# Patient Record
Sex: FEMALE | Race: WHITE | NOT HISPANIC OR LATINO | Employment: FULL TIME | URBAN - NONMETROPOLITAN AREA
[De-identification: names, ages, dates, MRNs, and addresses within clinical notes are randomized per-mention and may not be internally consistent; named-entity substitution may affect disease eponyms.]

---

## 2021-04-07 ENCOUNTER — APPOINTMENT (EMERGENCY)
Dept: RADIOLOGY | Facility: HOSPITAL | Age: 41
End: 2021-04-07
Payer: COMMERCIAL

## 2021-04-07 ENCOUNTER — HOSPITAL ENCOUNTER (EMERGENCY)
Facility: HOSPITAL | Age: 41
Discharge: HOME | End: 2021-04-07
Attending: EMERGENCY MEDICINE | Admitting: EMERGENCY MEDICINE
Payer: COMMERCIAL

## 2021-04-07 VITALS
BODY MASS INDEX: 38.41 KG/M2 | HEART RATE: 71 BPM | DIASTOLIC BLOOD PRESSURE: 83 MMHG | SYSTOLIC BLOOD PRESSURE: 149 MMHG | TEMPERATURE: 96.9 F | RESPIRATION RATE: 16 BRPM | OXYGEN SATURATION: 98 % | WEIGHT: 225 LBS | HEIGHT: 64 IN

## 2021-04-07 DIAGNOSIS — S93.409A ANKLE SPRAIN: Primary | ICD-10-CM

## 2021-04-07 PROCEDURE — 99283 EMERGENCY DEPT VISIT LOW MDM: CPT

## 2021-04-07 PROCEDURE — 73610 X-RAY EXAM OF ANKLE: CPT

## 2021-04-07 PROCEDURE — 99282 EMERGENCY DEPT VISIT SF MDM: CPT | Performed by: EMERGENCY MEDICINE

## 2021-04-07 RX ORDER — SUMATRIPTAN 100 MG/1
TABLET, FILM COATED ORAL
COMMUNITY
Start: 2021-04-05

## 2021-04-07 RX ORDER — LEVOTHYROXINE SODIUM 0.03 MG/1
25 TABLET ORAL DAILY
COMMUNITY

## 2021-04-07 NOTE — ED PROVIDER NOTES
History  Chief Complaint   Patient presents with    Ankle Injury     Patient has left ankle pain after rolling ankle while roller skating last night  Patient is a 81-year-old otherwise healthy female presenting to the emergency department complaining of left ankle pain, states yesterday evening she was roller-skating with her roller [place] team when she twisted the ankle, pain worsened throughout the day today prompting her to come to the emergency department for evaluation, she denies pain or injury elsewhere, no numbness or tingling          Prior to Admission Medications   Prescriptions Last Dose Informant Patient Reported? Taking? SUMAtriptan (IMITREX) 100 mg tablet   Yes Yes   Sig: One pill by mouth at onset of migraine  May repeat in 2 hours but not more than 2 tablets in 24 hours   levothyroxine 25 mcg tablet   Yes Yes   Sig: Take 25 mcg by mouth daily      Facility-Administered Medications: None       History reviewed  No pertinent past medical history  History reviewed  No pertinent surgical history  History reviewed  No pertinent family history  I have reviewed and agree with the history as documented  E-Cigarette/Vaping    E-Cigarette Use Never User      E-Cigarette/Vaping Substances     Social History     Tobacco Use    Smoking status: Never Smoker    Smokeless tobacco: Never Used   Substance Use Topics    Alcohol use: Not Currently    Drug use: Yes     Types: Marijuana       Review of Systems   Constitutional: Negative  HENT: Negative  Eyes: Negative  Respiratory: Negative  Cardiovascular: Negative  Gastrointestinal: Negative  Endocrine: Negative  Genitourinary: Negative  Musculoskeletal: Positive for arthralgias (Left ankle pain)  Skin: Negative  Allergic/Immunologic: Negative  Neurological: Negative  Hematological: Negative  Psychiatric/Behavioral: Negative          Physical Exam  Physical Exam  Constitutional:       Appearance: She is well-developed  HENT:      Head: Normocephalic and atraumatic  Eyes:      Conjunctiva/sclera: Conjunctivae normal       Pupils: Pupils are equal, round, and reactive to light  Neck:      Musculoskeletal: Normal range of motion and neck supple  Cardiovascular:      Rate and Rhythm: Normal rate  Pulmonary:      Effort: Pulmonary effort is normal    Abdominal:      Palpations: Abdomen is soft  Musculoskeletal: Normal range of motion  Feet:    Feet:      Comments: Mild tenderness to palpate over the medial malleolus of the left ankle, mild swelling with faint ecchymosis, 2+ DP pulses, distal sensation and motor is intact with brisk capillary refill  Skin:     General: Skin is warm and dry  Neurological:      Mental Status: She is alert and oriented to person, place, and time  Vital Signs  ED Triage Vitals [04/07/21 1911]   Temperature Pulse Respirations Blood Pressure SpO2   (!) 96 9 °F (36 1 °C) 71 16 149/83 98 %      Temp Source Heart Rate Source Patient Position - Orthostatic VS BP Location FiO2 (%)   Temporal Monitor Sitting Left arm --      Pain Score       5           Vitals:    04/07/21 1911   BP: 149/83   Pulse: 71   Patient Position - Orthostatic VS: Sitting             ED Medications  Medications - No data to display    Diagnostic Studies  Results Reviewed     None                 XR ankle 3+ views LEFT    (Results Pending)                     ED Course  ED Course as of Apr 07 2311 Wed Apr 07, 2021 2311 Imaging findings unremarkable and discussed at bedside, likely mild sprain, patient offered pain medication which she declined, she was provided with an Ace wrap and instructions to ice and elevate, continue anti-inflammatory medication at home, return if symptoms worsen, patient acknowledges understanding and agreement with this plan                                                Disposition  Final diagnoses:    Ankle sprain     Time reflects when diagnosis was documented in both MDM as applicable and the Disposition within this note     Time User Action Codes Description Comment    4/7/2021  7:53 PM Garcia Hoover Add [[id number]] Ankle sprain       ED Disposition     ED Disposition Condition Date/Time Comment    Discharge Stable Wed Apr 7, 2021  7:53 PM Ethan Ban discharge to home/self care  Follow-up Information     Follow up With Specialties Details Why Contact Info    Donald Oquendo DO Family Medicine In 2 days  [address] [place] [address]  [phone]            Discharge Medication List as of 4/7/2021  7:53 PM      CONTINUE these medications which have NOT CHANGED    Details   levothyroxine 25 mcg tablet Take 25 mcg by mouth daily, Historical Med      SUMAtriptan (IMITREX) 100 mg tablet One pill by mouth at onset of migraine  May repeat in 2 hours but not more than 2 tablets in 24 hours, Historical Med           No discharge procedures on file      PDMP Review     None          ED Provider  Electronically Signed by           Kyle Velez DO  04/07/21 1667

## 2024-03-12 ENCOUNTER — HOSPITAL ENCOUNTER (OUTPATIENT)
Dept: ULTRASOUND IMAGING | Facility: HOSPITAL | Age: 44
Discharge: HOME | End: 2024-03-12
Payer: COMMERCIAL

## 2024-03-12 DIAGNOSIS — R10.9 LEFT FLANK PAIN: ICD-10-CM

## 2024-03-12 PROCEDURE — 76775 US EXAM ABDO BACK WALL LIM: CPT

## 2025-02-06 ENCOUNTER — APPOINTMENT (EMERGENCY)
Dept: RADIOLOGY | Facility: HOSPITAL | Age: 45
End: 2025-02-06
Payer: COMMERCIAL

## 2025-02-06 ENCOUNTER — HOSPITAL ENCOUNTER (EMERGENCY)
Facility: HOSPITAL | Age: 45
Discharge: HOME | End: 2025-02-06
Attending: EMERGENCY MEDICINE
Payer: COMMERCIAL

## 2025-02-06 ENCOUNTER — APPOINTMENT (EMERGENCY)
Dept: CT IMAGING | Facility: HOSPITAL | Age: 45
End: 2025-02-06
Payer: COMMERCIAL

## 2025-02-06 ENCOUNTER — APPOINTMENT (EMERGENCY)
Dept: NON INVASIVE DIAGNOSTICS | Facility: HOSPITAL | Age: 45
End: 2025-02-06
Payer: COMMERCIAL

## 2025-02-06 ENCOUNTER — TELEPHONE (OUTPATIENT)
Age: 45
End: 2025-02-06

## 2025-02-06 VITALS
WEIGHT: 187.17 LBS | OXYGEN SATURATION: 97 % | HEART RATE: 63 BPM | TEMPERATURE: 97.6 F | BODY MASS INDEX: 32.13 KG/M2 | SYSTOLIC BLOOD PRESSURE: 155 MMHG | DIASTOLIC BLOOD PRESSURE: 71 MMHG | RESPIRATION RATE: 18 BRPM

## 2025-02-06 DIAGNOSIS — M51.369 BULGING OF LUMBAR INTERVERTEBRAL DISC: ICD-10-CM

## 2025-02-06 DIAGNOSIS — M51.369 DEGENERATIVE DISC DISEASE, LUMBAR: Primary | ICD-10-CM

## 2025-02-06 LAB
ALBUMIN SERPL BCG-MCNC: 4.7 G/DL (ref 3.5–5)
ALP SERPL-CCNC: 82 U/L (ref 34–104)
ALT SERPL W P-5'-P-CCNC: 14 U/L (ref 7–52)
ANION GAP SERPL CALCULATED.3IONS-SCNC: 8 MMOL/L (ref 4–13)
APTT PPP: 27 SECONDS (ref 23–34)
AST SERPL W P-5'-P-CCNC: 19 U/L (ref 13–39)
BACTERIA UR QL AUTO: NORMAL /HPF
BASOPHILS # BLD AUTO: 0.07 THOUSANDS/ΜL (ref 0–0.1)
BASOPHILS NFR BLD AUTO: 1 % (ref 0–1)
BILIRUB SERPL-MCNC: 0.64 MG/DL (ref 0.2–1)
BILIRUB UR QL STRIP: NEGATIVE
BUN SERPL-MCNC: 17 MG/DL (ref 5–25)
CALCIUM SERPL-MCNC: 9.4 MG/DL (ref 8.4–10.2)
CHLORIDE SERPL-SCNC: 104 MMOL/L (ref 96–108)
CK SERPL-CCNC: 107 U/L (ref 26–192)
CLARITY UR: CLEAR
CO2 SERPL-SCNC: 27 MMOL/L (ref 21–32)
COLOR UR: YELLOW
CREAT SERPL-MCNC: 0.76 MG/DL (ref 0.6–1.3)
EOSINOPHIL # BLD AUTO: 0.4 THOUSAND/ΜL (ref 0–0.61)
EOSINOPHIL NFR BLD AUTO: 5 % (ref 0–6)
ERYTHROCYTE [DISTWIDTH] IN BLOOD BY AUTOMATED COUNT: 12.6 % (ref 11.6–15.1)
EXT PREGNANCY TEST URINE: NEGATIVE
EXT. CONTROL: NORMAL
GFR SERPL CREATININE-BSD FRML MDRD: 97 ML/MIN/1.73SQ M
GLUCOSE SERPL-MCNC: 90 MG/DL (ref 65–140)
GLUCOSE UR STRIP-MCNC: NEGATIVE MG/DL
HCT VFR BLD AUTO: 44.8 % (ref 34.8–46.1)
HGB BLD-MCNC: 14.8 G/DL (ref 11.5–15.4)
HGB UR QL STRIP.AUTO: ABNORMAL
IMM GRANULOCYTES # BLD AUTO: 0.03 THOUSAND/UL (ref 0–0.2)
IMM GRANULOCYTES NFR BLD AUTO: 0 % (ref 0–2)
INR PPP: 0.94 (ref 0.85–1.19)
KETONES UR STRIP-MCNC: NEGATIVE MG/DL
LACTATE SERPL-SCNC: 0.9 MMOL/L (ref 0.5–2)
LEUKOCYTE ESTERASE UR QL STRIP: NEGATIVE
LIPASE SERPL-CCNC: 28 U/L (ref 11–82)
LYMPHOCYTES # BLD AUTO: 1.84 THOUSANDS/ΜL (ref 0.6–4.47)
LYMPHOCYTES NFR BLD AUTO: 22 % (ref 14–44)
MAGNESIUM SERPL-MCNC: 1.9 MG/DL (ref 1.9–2.7)
MCH RBC QN AUTO: 30.7 PG (ref 26.8–34.3)
MCHC RBC AUTO-ENTMCNC: 33 G/DL (ref 31.4–37.4)
MCV RBC AUTO: 93 FL (ref 82–98)
MONOCYTES # BLD AUTO: 0.45 THOUSAND/ΜL (ref 0.17–1.22)
MONOCYTES NFR BLD AUTO: 5 % (ref 4–12)
NEUTROPHILS # BLD AUTO: 5.53 THOUSANDS/ΜL (ref 1.85–7.62)
NEUTS SEG NFR BLD AUTO: 67 % (ref 43–75)
NITRITE UR QL STRIP: NEGATIVE
NON-SQ EPI CELLS URNS QL MICRO: NORMAL /HPF
NRBC BLD AUTO-RTO: 0 /100 WBCS
PH UR STRIP.AUTO: 6 [PH]
PLATELET # BLD AUTO: 260 THOUSANDS/UL (ref 149–390)
PMV BLD AUTO: 10 FL (ref 8.9–12.7)
POTASSIUM SERPL-SCNC: 3.8 MMOL/L (ref 3.5–5.3)
PROT SERPL-MCNC: 7.6 G/DL (ref 6.4–8.4)
PROT UR STRIP-MCNC: NEGATIVE MG/DL
PROTHROMBIN TIME: 12.9 SECONDS (ref 12.3–15)
RBC # BLD AUTO: 4.82 MILLION/UL (ref 3.81–5.12)
RBC #/AREA URNS AUTO: NORMAL /HPF
SODIUM SERPL-SCNC: 139 MMOL/L (ref 135–147)
SP GR UR STRIP.AUTO: 1.02 (ref 1–1.03)
UROBILINOGEN UR QL STRIP.AUTO: 0.2 E.U./DL
WBC # BLD AUTO: 8.32 THOUSAND/UL (ref 4.31–10.16)
WBC #/AREA URNS AUTO: NORMAL /HPF

## 2025-02-06 PROCEDURE — 81025 URINE PREGNANCY TEST: CPT | Performed by: EMERGENCY MEDICINE

## 2025-02-06 PROCEDURE — 36415 COLL VENOUS BLD VENIPUNCTURE: CPT | Performed by: EMERGENCY MEDICINE

## 2025-02-06 PROCEDURE — 80053 COMPREHEN METABOLIC PANEL: CPT | Performed by: EMERGENCY MEDICINE

## 2025-02-06 PROCEDURE — 99284 EMERGENCY DEPT VISIT MOD MDM: CPT | Performed by: EMERGENCY MEDICINE

## 2025-02-06 PROCEDURE — 83690 ASSAY OF LIPASE: CPT | Performed by: EMERGENCY MEDICINE

## 2025-02-06 PROCEDURE — 73502 X-RAY EXAM HIP UNI 2-3 VIEWS: CPT

## 2025-02-06 PROCEDURE — 82550 ASSAY OF CK (CPK): CPT | Performed by: EMERGENCY MEDICINE

## 2025-02-06 PROCEDURE — 85730 THROMBOPLASTIN TIME PARTIAL: CPT | Performed by: EMERGENCY MEDICINE

## 2025-02-06 PROCEDURE — 85610 PROTHROMBIN TIME: CPT | Performed by: EMERGENCY MEDICINE

## 2025-02-06 PROCEDURE — 99284 EMERGENCY DEPT VISIT MOD MDM: CPT

## 2025-02-06 PROCEDURE — 74176 CT ABD & PELVIS W/O CONTRAST: CPT

## 2025-02-06 PROCEDURE — 81001 URINALYSIS AUTO W/SCOPE: CPT | Performed by: EMERGENCY MEDICINE

## 2025-02-06 PROCEDURE — 93971 EXTREMITY STUDY: CPT | Performed by: SURGERY

## 2025-02-06 PROCEDURE — 83605 ASSAY OF LACTIC ACID: CPT | Performed by: EMERGENCY MEDICINE

## 2025-02-06 PROCEDURE — 93971 EXTREMITY STUDY: CPT

## 2025-02-06 PROCEDURE — 83735 ASSAY OF MAGNESIUM: CPT | Performed by: EMERGENCY MEDICINE

## 2025-02-06 PROCEDURE — 85025 COMPLETE CBC W/AUTO DIFF WBC: CPT | Performed by: EMERGENCY MEDICINE

## 2025-02-06 RX ORDER — METHOCARBAMOL 500 MG/1
500 TABLET, FILM COATED ORAL 2 TIMES DAILY
Qty: 20 TABLET | Refills: 0 | Status: SHIPPED | OUTPATIENT
Start: 2025-02-06 | End: 2025-02-12

## 2025-02-06 NOTE — DISCHARGE INSTRUCTIONS
The muscle relaxer, Robaxin, may make you sleepy.  Please refrain from driving or using machinery.    Patient Education     Herniated Disc Discharge Instructions   About this topic   The spine is made up of bones called vertebrae. These bones are lined up on top of each other. In between the bones there are discs. They act like shock absorbers and also allow movement. A herniated disc happens when the outer shell of the disc breaks. Then, the jelly-like material inside the disc leaks out. This can bother nearby nerves. It then can cause pain, numbness, weakness, or tingling in the back or legs.       What care is needed at home?   Ask your doctor what you need to do when you go home. Make sure you ask questions if you do not understand what the doctor says.  Try ice or heat to help with pain.  Place an ice pack or a bag of frozen peas wrapped in a towel over the painful part. Never put ice right on the skin. Do not leave the ice on more than 10 to 15 minutes at a time.  Put a heating pad on your back for no more than 20 minutes at a time. Never go to sleep with a heating pad on as this can cause burns.  Stay active. Do not stay in bed longer than 1 to 2 days. Staying in bed or resting too long may make your back weak and stiff. This may make your problem worse.  Talk to your doctor about exercises you can do.  What follow-up care is needed?   Your doctor may ask you to make visits to the office to check on your progress. Be sure to keep these visits.  Your doctor may send you to an expert who works with spines or nerves.  Your doctor may send you to a physical therapist (PT). The PT will teach you exercises and stretches for your back.  What drugs may be needed?   The doctor may order drugs to:  Help with pain and swelling  Help the muscles relax  Will physical activity be limited?   Talk to your doctor about how much activity is right for you. Stay away from activities that make your back pain worse. Avoid lifting  heavy objects, picking up objects off the floor, and hard activities like playing sports.  What problems could happen?   Nerve problems  Less movement of arms or legs  Numbness in the arms or legs  Difficulty walking or doing other activities  Bowel and bladder control problems  Ongoing pain  What can be done to prevent this health problem?   Stay active and work out to keep your muscles strong and flexible.   Keep a healthy weight to avoid putting too much stress on your spine.  Use good posture.  Use proper ways to lift and bend:  Spread your feet apart so you have a good base of support. Then, bend with your knees when you  something from the ground.  When lifting and moving an object, keep your back straight. Keep the object as close to your body as possible. Do not twist. Instead, move your feet to the direction you are going.  When do I need to call the doctor?   Pain along with a fever  Not able to hold your foot up. This is called “foot drop”.  Loss of bladder control  Very bad pain that limits your movements like walking  You are not feeling better in 2 to 3 days or you are feeling worse  Teach Back: Helping You Understand   The Teach Back Method helps you understand the information we are giving you. After you talk with the staff, tell them in your own words what you learned. This helps to make sure the staff has described each thing clearly. It also helps to explain things that may have been confusing. Before going home, make sure you can do these:  I can tell you about my condition.  I can tell you what may help ease my pain.  I can tell you what I will do if I have more pain, am not able to hold my foot up, or have a loss of bladder control.  Last Reviewed Date   2021-07-13  Consumer Information Use and Disclaimer   This generalized information is a limited summary of diagnosis, treatment, and/or medication information. It is not meant to be comprehensive and should be used as a tool to help the  user understand and/or assess potential diagnostic and treatment options. It does NOT include all information about conditions, treatments, medications, side effects, or risks that may apply to a specific patient. It is not intended to be medical advice or a substitute for the medical advice, diagnosis, or treatment of a health care provider based on the health care provider's examination and assessment of a patient’s specific and unique circumstances. Patients must speak with a health care provider for complete information about their health, medical questions, and treatment options, including any risks or benefits regarding use of medications. This information does not endorse any treatments or medications as safe, effective, or approved for treating a specific patient. [employer] and its affiliates disclaim any warranty or liability relating to this information or the use thereof. The use of this information is governed by the Terms of Use, available at https://www.Expa.Hiperos/en/know/clinical-effectiveness-terms   Copyright   Copyright © 2024 [employer] and its affiliates and/or licensors. All rights reserved.  Patient Education     Degenerative Disc Disease ED   General Information   You came to the Emergency Department (ED) for degenerative disc disease. Normally, your discs act like shock absorbers since they sit between the vertebrae in your back. As you age, the discs breakdown. This may cause back pain. Bulging discs or tissues near them may press on the nerves of your spine. This may cause you to have pain, weakness, numbness, or tingling.  You may be waiting on some test results. The staff will contact you if there are concerning results.  What care is needed at home?   Call your regular doctor to let them know you were in the ED. Make a follow-up appointment if you were told to.  Stay as active as you can without causing too much pain. It is OK to rest your back for a day or so. But be  sure to get up and move around gently during the day as you are able. After a few days, slowly start to increase your activity level as you are able to. If something causes your pain to come back or get worse, stop and go back to doing easier activities that did not hurt.  You may want to sleep with a pillow under or between your knees if this eases your pain.  You may want to take medicines like acetaminophen, ibuprofen, or naproxen to help with pain.  When do I need to get emergency help?   Return to the ED if:   You are unable to walk or cannot control your bowels or bladder.  When do I need to call the doctor?   The numbness or weakness in your arm or leg gets worse.  Your pain is getting worse, even with medicines and rest.  You are not able to do your normal activities because of the pain.  You have new or worsening symptoms.  Last Reviewed Date   2021-04-08  Consumer Information Use and Disclaimer   This generalized information is a limited summary of diagnosis, treatment, and/or medication information. It is not meant to be comprehensive and should be used as a tool to help the user understand and/or assess potential diagnostic and treatment options. It does NOT include all information about conditions, treatments, medications, side effects, or risks that may apply to a specific patient. It is not intended to be medical advice or a substitute for the medical advice, diagnosis, or treatment of a health care provider based on the health care provider's examination and assessment of a patient’s specific and unique circumstances. Patients must speak with a health care provider for complete information about their health, medical questions, and treatment options, including any risks or benefits regarding use of medications. This information does not endorse any treatments or medications as safe, effective, or approved for treating a specific patient. [employer] and its affiliates disclaim any warranty or  liability relating to this information or the use thereof. The use of this information is governed by the Terms of Use, available at https://www.wolterskluwer.com/en/know/clinical-effectiveness-terms   Copyright   Copyright © 2024 [employer] and its affiliates and/or licensors. All rights reserved.

## 2025-02-06 NOTE — TELEPHONE ENCOUNTER
Caller: Kathy at the ED    Doctor: Dr. Mendoza referral    Reason for call:     Kathy transferred to [facility]    Call back#: n/a

## 2025-02-06 NOTE — Clinical Note
Martha Hair was seen and treated in our emergency department on 2/6/2025.                Diagnosis:     Martha  may return to work on return date.    She may return on this date: 02/10/2025         If you have any questions or concerns, please don't hesitate to call.      Trey Rivera MD    ______________________________           _______________          _______________  Hospital Representative                              Date                                Time

## 2025-02-06 NOTE — ED PROVIDER NOTES
Time reflects when diagnosis was documented in both MDM as applicable and the Disposition within this note       Time User Action Codes Description Comment    2/6/2025  2:54 PM Trey Rivera Add [M51.369] Degenerative disc disease, lumbar     2/6/2025  3:06 PM Trey Rivera Add [M51.369] Bulging of lumbar intervertebral disc     2/6/2025  3:06 PM Trey Rivera Modify [M51.369] Bulging of lumbar intervertebral disc L3- L4          ED Disposition       ED Disposition   Discharge    Condition   Stable    Date/Time   Thu Feb 6, 2025  3:08 PM    Comment   Martha SARAI Hair discharge to home/self care.                   Assessment & Plan       Medical Decision Making  Amount and/or Complexity of Data Reviewed  Labs: ordered. Decision-making details documented in ED Course.  Radiology: ordered and independent interpretation performed. Decision-making details documented in ED Course.  Discussion of management or test interpretation with external provider(s): At risk for not limited to UTI, cystitis, bladder stone, kidney stone, ureteral stone, sciatica, muscle strain    Risk  Prescription drug management.        ED Course as of 02/06/25 1508   Thu Feb 06, 2025   1354 No DVT per vascular technician.   1354 Doubt leg ischemia as the patient has good pulses.  Symptoms been ongoing for the past 1 week.  No signs of ischemia.  Dorsalis pedis pulse and posterior tibial pulse are 2+.   1506 Patient does not wish to have steroids at this time.   1508 Discussed with patient about lab and CAT scan results.  Made aware that the urine showed blood but the microscopic showed no red blood cells.  Patient's pain is consistent with her CT findings on the lumbar spine.       Medications - No data to display    ED Risk Strat Scores                          SBIRT 20yo+      Flowsheet Row Most Recent Value   Initial Alcohol Screen: US AUDIT-C     1. How often do you have a drink containing alcohol? 0 Filed at: 02/06/2025 9806   2.  How many drinks containing alcohol do you have on a typical day you are drinking?  0 Filed at: 02/06/2025 1153   3a. Male UNDER 65: How often do you have five or more drinks on one occasion? 0 Filed at: 02/06/2025 0510   3b. FEMALE Any Age, or MALE 65+: How often do you have 4 or more drinks on one occassion? 0 Filed at: 02/06/2025 1335   Audit-C Score 0 Filed at: 02/06/2025 1338   TOM: How many times in the past year have you...    Used an illegal drug or used a prescription medication for non-medical reasons? Never Filed at: 02/06/2025 9788                            History of Present Illness       Chief Complaint   Patient presents with    Hip Pain     leg    Leg Pain    Blood in Urine     Pt has complaints of left hip pain radiating to left inner thigh for over 1 week, pt reports blood in urine at this time.        History reviewed. No pertinent past medical history.   History reviewed. No pertinent surgical history.   History reviewed. No pertinent family history.   Social History     Tobacco Use    Smoking status: Never    Smokeless tobacco: Never   Vaping Use    Vaping status: Never Used   Substance Use Topics    Alcohol use: Not Currently    Drug use: Yes     Types: Marijuana      E-Cigarette/Vaping    E-Cigarette Use Never User       E-Cigarette/Vaping Substances      I have reviewed and agree with the history as documented.     Patient complains of pain in her left hip area that radiates to the left groin region.  Feels like her leg is cold.  Also with hematuria today.  No dysuria or frequency.  Last menstrual period was last week.      History provided by:  Patient   used: No    Hip Pain  Location:  Left hip  Quality:  Achy  Severity:  Mild  Onset quality:  Gradual  Duration:  1 week  Timing:  Constant  Progression:  Worsening  Chronicity:  New  Context:  Left hip pain rating to left groin and thigh  Relieved by:  Nothing  Worsened by:  Nothing  Associated symptoms: no abdominal  pain, no chest pain, no cough, no diarrhea, no ear pain, no fever, no headaches, no nausea, no rash, no rhinorrhea, no shortness of breath, no sore throat, no vomiting and no wheezing    Leg Pain  Associated symptoms: no fever and no neck pain    Blood in Urine  Irritative symptoms do not include frequency. Pertinent negatives include no abdominal pain, chills, dysuria, fever, flank pain, nausea or vomiting.       Review of Systems   Constitutional:  Negative for chills and fever.   HENT:  Negative for ear pain, hearing loss, rhinorrhea, sore throat, trouble swallowing and voice change.    Eyes:  Negative for pain and discharge.   Respiratory:  Negative for cough, shortness of breath and wheezing.    Cardiovascular:  Negative for chest pain and palpitations.   Gastrointestinal:  Negative for abdominal pain, blood in stool, constipation, diarrhea, nausea and vomiting.   Genitourinary:  Positive for hematuria. Negative for dysuria, flank pain and frequency.   Musculoskeletal:  Negative for joint swelling, neck pain and neck stiffness.   Skin:  Negative for rash and wound.   Neurological:  Negative for dizziness, seizures, syncope, facial asymmetry and headaches.   Psychiatric/Behavioral:  Negative for hallucinations, self-injury and suicidal ideas.    All other systems reviewed and are negative.          Objective       ED Triage Vitals   Temperature Pulse Blood Pressure Respirations SpO2 Patient Position - Orthostatic VS   02/06/25 1332 02/06/25 1332 02/06/25 1336 02/06/25 1332 02/06/25 1332 02/06/25 1332   97.6 °F (36.4 °C) 63 155/71 18 97 % Lying      Temp Source Heart Rate Source BP Location FiO2 (%) Pain Score    02/06/25 1332 02/06/25 1332 -- -- 02/06/25 1332    Temporal Monitor   6      Vitals      Date and Time Temp Pulse SpO2 Resp BP Pain Score FACES Pain Rating User   02/06/25 1336 -- -- -- -- 155/71 -- -- MY   02/06/25 1332 97.6 °F (36.4 °C) 63 97 % 18 -- 6 -- MY            Physical Exam  Vitals and nursing  note reviewed.   Constitutional:       General: She is not in acute distress.     Appearance: She is well-developed.   HENT:      Head: Normocephalic and atraumatic.      Right Ear: External ear normal.      Left Ear: External ear normal.   Eyes:      General: No scleral icterus.        Right eye: No discharge.         Left eye: No discharge.      Extraocular Movements: Extraocular movements intact.      Conjunctiva/sclera: Conjunctivae normal.   Cardiovascular:      Rate and Rhythm: Normal rate and regular rhythm.      Heart sounds: Normal heart sounds. No murmur heard.  Pulmonary:      Effort: Pulmonary effort is normal.      Breath sounds: Normal breath sounds. No wheezing or rales.   Abdominal:      General: Bowel sounds are normal. There is no distension.      Palpations: Abdomen is soft.      Tenderness: There is no abdominal tenderness. There is no guarding or rebound.   Musculoskeletal:         General: No deformity. Normal range of motion.      Cervical back: Normal range of motion and neck supple.      Comments: Both feet appear equally warm.  No signs of cyanosis.  Dorsalis pedis and posterior tibial pulse 2+.   Skin:     General: Skin is warm and dry.      Findings: No rash.   Neurological:      General: No focal deficit present.      Mental Status: She is alert and oriented to person, place, and time.      Cranial Nerves: No cranial nerve deficit.   Psychiatric:         Mood and Affect: Mood normal.         Behavior: Behavior normal.         Thought Content: Thought content normal.         Judgment: Judgment normal.         Results Reviewed       Procedure Component Value Units Date/Time    Urine Microscopic [[id number]]  (Normal) Collected: 02/06/25 1402    Lab Status: Final result Specimen: Urine, Clean Catch Updated: 02/06/25 1422     RBC, UA 0-1 /hpf      WBC, UA 0-1 /hpf      Epithelial Cells Occasional /hpf      Bacteria, UA Occasional /hpf     UA w Reflex to Microscopic w Reflex to Culture  [[id number]]  (Abnormal) Collected: 02/06/25 1402    Lab Status: Final result Specimen: Urine, Clean Catch Updated: 02/06/25 1413     Color, UA Yellow     Clarity, UA Clear     Specific Gravity, UA 1.025     pH, UA 6.0     Leukocytes, UA Negative     Nitrite, UA Negative     Protein, UA Negative mg/dl      Glucose, UA Negative mg/dl      Ketones, UA Negative mg/dl      Urobilinogen, UA 0.2 E.U./dl      Bilirubin, UA Negative     Occult Blood, UA Trace-lysed    Lactic acid, plasma (w/reflex if result > 2.0) [[id number]]  (Normal) Collected: 02/06/25 1339    Lab Status: Final result Specimen: Blood from Arm, Right Updated: 02/06/25 1409     LACTIC ACID 0.9 mmol/L     Narrative:      Result may be elevated if tourniquet was used during collection.    Comprehensive metabolic panel [[id number]] Collected: 02/06/25 1339    Lab Status: Final result Specimen: Blood from Arm, Right Updated: 02/06/25 1409     Sodium 139 mmol/L      Potassium 3.8 mmol/L      Chloride 104 mmol/L      CO2 27 mmol/L      ANION GAP 8 mmol/L      BUN 17 mg/dL      Creatinine 0.76 mg/dL      Glucose 90 mg/dL      Calcium 9.4 mg/dL      AST 19 U/L      ALT 14 U/L      Alkaline Phosphatase 82 U/L      Total Protein 7.6 g/dL      Albumin 4.7 g/dL      Total Bilirubin 0.64 mg/dL      eGFR 97 ml/min/1.73sq m     Narrative:      [employer] guidelines for Chronic Kidney Disease (CKD):     Stage 1 with normal or high GFR (GFR > 90 mL/min/1.73 square meters)    Stage 2 Mild CKD (GFR = 60-89 mL/min/1.73 square meters)    Stage 3A Moderate CKD (GFR = 45-59 mL/min/1.73 square meters)    Stage 3B Moderate CKD (GFR = 30-44 mL/min/1.73 square meters)    Stage 4 Severe CKD (GFR = 15-29 mL/min/1.73 square meters)    Stage 5 End Stage CKD (GFR <15 mL/min/1.73 square meters)  Note: GFR calculation is accurate only with a steady state creatinine    Magnesium [[id number]]  (Normal) Collected: 02/06/25 1339    Lab Status: Final result Specimen: Blood  from Arm, Right Updated: 02/06/25 1409     Magnesium 1.9 mg/dL     Lipase [[id number]]  (Normal) Collected: 02/06/25 1339    Lab Status: Final result Specimen: Blood from Arm, Right Updated: 02/06/25 1409     Lipase 28 u/L     CK [[id number]]  (Normal) Collected: 02/06/25 1339    Lab Status: Final result Specimen: Blood from Arm, Right Updated: 02/06/25 1409     Total  U/L     POCT pregnancy, urine [[id number]]  (Normal) Collected: 02/06/25 1402    Lab Status: Final result Updated: 02/06/25 1402     EXT Preg Test, Ur Negative     Control Valid    Protime-INR [[id number]]  (Normal) Collected: 02/06/25 1339    Lab Status: Final result Specimen: Blood from Arm, Right Updated: 02/06/25 1401     Protime 12.9 seconds      INR 0.94    Narrative:      INR Therapeutic Range    Indication                                             INR Range      Atrial Fibrillation                                               2.0-3.0  Hypercoagulable State                                    2.0.2.3  Left Ventricular Asist Device                            2.0-3.0  Mechanical Heart Valve                                  -    Aortic(with afib, MI, embolism, HF, LA enlargement,    and/or coagulopathy)                                     2.0-3.0 (2.5-3.5)     Mitral                                                             2.5-3.5  Prosthetic/Bioprosthetic Heart Valve               2.0-3.0  Venous thromboembolism (VTE: VT, PE        2.0-3.0    APTT [[id number]]  (Normal) Collected: 02/06/25 1339    Lab Status: Final result Specimen: Blood from Arm, Right Updated: 02/06/25 1401     PTT 27 seconds     CBC and differential [[id number]] Collected: 02/06/25 1339    Lab Status: Final result Specimen: Blood from Arm, Right Updated: 02/06/25 1348     WBC 8.32 Thousand/uL      RBC 4.82 Million/uL      Hemoglobin 14.8 g/dL      Hematocrit 44.8 %      MCV 93 fL      MCH 30.7 pg      MCHC 33.0 g/dL      RDW 12.6 %      MPV 10.0 fL      Platelets 260  Thousands/uL      nRBC 0 /100 WBCs      Segmented % 67 %      Immature Grans % 0 %      Lymphocytes % 22 %      Monocytes % 5 %      Eosinophils Relative 5 %      Basophils Relative 1 %      Absolute Neutrophils 5.53 Thousands/µL      Absolute Immature Grans 0.03 Thousand/uL      Absolute Lymphocytes 1.84 Thousands/µL      Absolute Monocytes 0.45 Thousand/µL      Eosinophils Absolute 0.40 Thousand/µL      Basophils Absolute 0.07 Thousands/µL             XR hip/pelv 2-3 vws left if performed   Final Interpretation by Jaleel Cosby MD (02/06 8495)      No acute osseous abnormality.         Computerized Assisted Algorithm (CAA) may have been used to analyze all applicable images.            Workstation performed: [id number]         CT abdomen pelvis wo contrast   Final Interpretation by Lucho Alvarez MD (02/06 2401)      No acute findings in the abdomen or pelvis within the limits of unenhanced technique.      No collecting system stone or obstruction.            Workstation performed: [id number]         CT recon only lumbar spine   Final Interpretation by Lucho Alvarez MD (02/06 2285)      1.  No fracture or traumatic subluxation.   2.  Chronic bilateral L3 pars defects. No significant L3-L4 listhesis.   3.  L3-L4 disc bulging with foraminal stenosis.   4.  Moderate to severe L3-L4 degenerative disc disease.            Workstation performed: [id number]         VAS lower limb venous duplex study, unilateral/limited    (Results Pending)       Procedures    ED Medication and Procedure Management   Prior to Admission Medications   Prescriptions Last Dose Informant Patient Reported? Taking?   SUMAtriptan (IMITREX) 100 mg tablet   Yes No   Sig: One pill by mouth at onset of migraine. May repeat in 2 hours but not more than 2 tablets in 24 hours   levothyroxine 25 mcg tablet   Yes No   Sig: Take 50 mcg by mouth daily      Facility-Administered Medications: None     Patient's Medications   Discharge Prescriptions     METHOCARBAMOL (ROBAXIN) 500 MG TABLET    Take 1 tablet (500 mg total) by mouth 2 (two) times a day       Start Date: 2/6/2025  End Date: --       Order Dose: 500 mg       Quantity: 20 tablet    Refills: 0       ED SEPSIS DOCUMENTATION   Time reflects when diagnosis was documented in both MDM as applicable and the Disposition within this note       Time User Action Codes Description Comment    2/6/2025  2:54 PM Trey Rivera Add [M51.369] Degenerative disc disease, lumbar     2/6/2025  3:06 PM Trey Rivera Add [M51.369] Bulging of lumbar intervertebral disc     2/6/2025  3:06 PM Trey Rivera Modify [M51.369] Bulging of lumbar intervertebral disc L3- L4                 Trey Rivera MD  02/06/25 8947

## 2025-02-11 RX ORDER — LEVOTHYROXINE SODIUM 50 UG/1
1 TABLET ORAL DAILY
COMMUNITY
Start: 2025-01-20

## 2025-02-11 RX ORDER — LORATADINE 10 MG/1
10 TABLET ORAL
COMMUNITY

## 2025-02-12 ENCOUNTER — CONSULT (OUTPATIENT)
Dept: PAIN MEDICINE | Facility: CLINIC | Age: 45
End: 2025-02-12
Payer: COMMERCIAL

## 2025-02-12 VITALS — BODY MASS INDEX: 33.42 KG/M2 | WEIGHT: 188.6 LBS | HEIGHT: 63 IN

## 2025-02-12 DIAGNOSIS — M47.26 OTHER SPONDYLOSIS WITH RADICULOPATHY, LUMBAR REGION: Primary | ICD-10-CM

## 2025-02-12 PROCEDURE — 99204 OFFICE O/P NEW MOD 45 MIN: CPT | Performed by: ANESTHESIOLOGY

## 2025-02-12 RX ORDER — FOLIC ACID 0.4 MG
400 TABLET ORAL DAILY
COMMUNITY

## 2025-02-12 RX ORDER — GABAPENTIN 300 MG/1
300 CAPSULE ORAL 3 TIMES DAILY
Qty: 90 CAPSULE | Refills: 2 | Status: SHIPPED | OUTPATIENT
Start: 2025-02-12

## 2025-02-12 NOTE — PROGRESS NOTES
Assessment  1. Other spondylosis with radiculopathy, lumbar region  -     Ambulatory referral to Physical Therapy; Future  -     gabapentin (NEURONTIN) 300 mg capsule; Take 1 capsule (300 mg total) by mouth 3 (three) times a day    Left-sided lumbar radicular pain in the L3 dermatomal distribution accompanied by pain limited weakness numbness and paresthesias.  Patient has not yet participated with formal PT but has particpated with chiropractic efforts. Subacute pain with decreased participation with IADLs over the past 3 months.  Has been taking OTC ibuprofen and tylenol in addition to Robaxin with modest benefit.  5/5 strength bilaterally, positive SLR left-sided. Reflexes 2+.  Additionally there is positive facet loading, left greater than right. Denies any gait instability, saddle anesthesia. On CT imaging moderate to severe L3-L4 disc space narrowing, subchondral sclerosis, and endplate spondylosis, consistent with degenerative disc disease. No bony canal stenosis or foraminal narrowing. Circumferential disc bulging at L3-L4 with mild right and moderate left foraminal stenosis.  Risks, benefits alternatives epidural steroid injections thoroughly discussed with patient.  Handouts provided questions answered to patient's satisfaction.  Lifestyle modifications extensively discussed including diet, exercise and weight loss in addition to core strengthening.  Will proceed with multimodal pain therapy plan as noted below:    Plan  -L3-L4 LESI; may call back to schedule procedure  -f/u 6 weeks  -gabapentin 300 mg t.i.d. Ordered for patient; counseled regarding sedative effects of taking this medication and provided up titration calendar.  Counseled not to take medication while driving or operating heavy machinery/using stairs  -script provided for formal physical therapy for lumbar radiculopathy; Physician directed home exercise plan as per [employer] demonstrated and handouts provided that patient plans to participate  with for 1 hour, twice a week for the next 6 weeks.     There are risks associated with opioid medications, including dependence, addiction and tolerance. The patient understands and agrees to use these medications only as prescribed. Potential side effects of the medications include, but are not limited to, constipation, drowsiness, addiction, impaired judgment and risk of fatal overdose if not taken as prescribed. The patient was warned against driving while taking sedation medications or operating heavy machinery. The patient voiced understanding. Sharing medications is a felony. At this point in time, the patient is showing no signs of addiction, abuse, diversion or suicidal ideation.     [place] Prescription Drug Monitoring Program report was reviewed and was appropriate      Complete risks and benefits including bleeding, infection, tissue reaction, nerve injury and allergic reaction were discussed. The approach was demonstrated using models and literature was provided. Verbal and written consent was obtained.     My impressions and treatment recommendations were discussed in detail with the patient who verbalized understanding and had no further questions.  Discharge instructions were provided. I personally saw and examined the patient and I agree with the above discussed plan of care.    Review of external notes including PT notes, PCP notes and specialist notes was performed at this visit in addition to review of new ordered imaging and past imaging to develop or modify multidisciplinary pain plan    New Medications Ordered This Visit   Medications    loratadine (CLARITIN) 10 mg tablet     Sig: Take 10 mg by mouth    levothyroxine 50 mcg tablet     Sig: Take 1 tablet by mouth in the morning    VITAMIN D, CHOLECALCIFEROL, PO     Sig: Take by mouth    folic acid (FOLVITE) 400 mcg tablet     Sig: Take 400 mcg by mouth daily    Cyanocobalamin (VITAMIN B 12 PO)     Sig: Take by mouth       History of  Present Illness    Martha Hair is a 44 y.o. female with pmhx of migraines, obesity, hypothyroidism presenting with subacute  lumbar radicular pain in the left L3 dermatomal distributions. Debilitating pain limited weakness numbness and paresthesias accompany the pain. The patient rates the pain at a 8/10 accompanied by electric shock-like shooting features and crampy burning pain in the aforementioned dermatomal distributions.  The pain is worse in the mornings as well as the end of the day; exertion such as walking for long periods of time seems to exacerbate the pain.  She tries to maintain an active lifestyle and finds that the current degree of pain seems to compromises her efforts.  The pain significantly impacts independent activities of daily living and contributes to significant disability.  She has attempted chiropractic but not physical therapy with minimal relief of the pain.  She has taken nsaids, tylenol as well as robaxin with limited relief of the pain as well. She has never tried epidural steroid injections in the past. She denies any bowel or bladder dysfunction/incontinence, saddle anesthesia or gait instability.    I have personally reviewed and/or updated the patient's past medical history, past surgical history, family history, social history, current medications, allergies, and vital signs today.     Review of Systems   Constitutional:  Positive for activity change.   HENT: Negative.     Eyes: Negative.    Respiratory: Negative.     Cardiovascular: Negative.    Gastrointestinal: Negative.    Endocrine: Negative.    Genitourinary: Negative.    Musculoskeletal:  Positive for arthralgias, back pain, gait problem and myalgias.   Skin: Negative.    Allergic/Immunologic: Negative.    Neurological:  Positive for weakness and numbness.   Hematological: Negative.    Psychiatric/Behavioral: Negative.     All other systems reviewed and are negative.      There is no problem list on file for this  "patient.      Past Medical History:   Diagnosis Date    Acquired hypothyroidism     Kidney cysts     Lumbar disc herniation     Migraine     Motion sickness     Vitamin D deficiency        History reviewed. No pertinent surgical history.    History reviewed. No pertinent family history.    Social History     Occupational History    Not on file   Tobacco Use    Smoking status: Never    Smokeless tobacco: Never   Vaping Use    Vaping status: Never Used   Substance and Sexual Activity    Alcohol use: Not Currently    Drug use: Yes     Types: Marijuana    Sexual activity: Not on file       Current Outpatient Medications on File Prior to Visit   Medication Sig    Cyanocobalamin (VITAMIN B 12 PO) Take by mouth    folic acid (FOLVITE) 400 mcg tablet Take 400 mcg by mouth daily    levothyroxine 50 mcg tablet Take 1 tablet by mouth in the morning    loratadine (CLARITIN) 10 mg tablet Take 10 mg by mouth (Patient taking differently: Take 10 mg by mouth PRN)    methocarbamol (ROBAXIN) 500 mg tablet Take 1 tablet (500 mg total) by mouth 2 (two) times a day    SUMAtriptan (IMITREX) 100 mg tablet One pill by mouth at onset of migraine. May repeat in 2 hours but not more than 2 tablets in 24 hours (Patient taking differently: As needed)    VITAMIN D, CHOLECALCIFEROL, PO Take by mouth    [DISCONTINUED] levothyroxine 25 mcg tablet Take 50 mcg by mouth daily (Patient not taking: Reported on 2/12/2025)     No current facility-administered medications on file prior to visit.       Allergies   Allergen Reactions    Alcohol - Food Allergy Facial Swelling    Ibuprofen Facial Swelling         Physical Exam    Ht 5' 3\" (1.6 m)   Wt 85.5 kg (188 lb 9.6 oz)   LMP 01/27/2025 (Approximate)   BMI 33.41 kg/m²     Constitutional: normal, well developed, well nourished, alert, in no distress and non-toxic and no overt pain behavior. and obese  Eyes: anicteric  HEENT: grossly intact  Neck: supple, symmetric, trachea midline and no masses "   Pulmonary:even and unlabored  Cardiovascular:No edema or pitting edema present  Skin:Normal without rashes or lesions and well hydrated  Psychiatric:Mood and affect appropriate  Neurologic:Cranial Nerves II-XII grossly intact Sensation grossly intact; no clonus negative gardner's. Reflexes 2+ and brisk. SLR negative bilaterally.   Musculoskeletal:normal gait. 5/5 strength bilaterally with AROM in lower extremities. Normal heel toe and tip toe walking. Significant pain with lumbar facet loading bilaterally and with lateral spine rotation, ttp over lumbar paraspinal muscles, left greater than right. Negative woody's test, negative gaenslen's negative SIJ loading bilaterally.    Imaging    CT RECON ONLY LUMBAR SPINE (NO CHARGE)     INDICATION:   Back pain.     COMPARISON:  None     TECHNIQUE: Axial CT examination of the lumbar spine was obtained utilizing reconstructed images from CT of the chest, abdomen and pelvis performed the same day.  Images were reformatted in the sagittal and coronal planes.     This examination, like all CT scans performed in the [facility], was performed utilizing techniques to minimize radiation dose exposure, including the use of iterative reconstruction and automated exposure control.     FINDINGS:     ALIGNMENT: Normal alignment. No spondylolisthesis.  No scoliosis.     VERTEBRAE: No acute fracture.  No acute osseous abnormality. Chronic bilateral L3 pars defects. No significant listhesis.     DEGENERATIVE CHANGES: Moderate to severe L3-L4 disc space narrowing, subchondral sclerosis, and endplate spondylosis, consistent with degenerative disc disease. No bony canal stenosis or foraminal narrowing. Circumferential disc bulging at L3-L4 with   mild right and moderate left foraminal stenosis.     PREVERTEBRAL AND PARASPINAL SOFT TISSUES: Normal.     OTHER: Unremarkable     IMPRESSION:     1.  No fracture or traumatic subluxation.  2.  Chronic bilateral L3 pars defects. No  significant L3-L4 listhesis.  3.  L3-L4 disc bulging with foraminal stenosis.  4.  Moderate to severe L3-L4 degenerative disc disease.

## 2025-02-12 NOTE — PATIENT INSTRUCTIONS
Adult Patient Counseling Points     Gabapentin   What is this drug used for?   It is used to treat painful nerve diseases.  It is used to help control certain kinds of seizures.  It may be given to you for other reasons. Talk with the doctor.  All drugs may cause side effects. However, many people have no side effects or only have minor side effects. Call your doctor or get medical help if any of these side effects or any other side effects bother you or do not go away:   Nausea  Vomiting  Diarrhea  Dry mouth  WARNING/CAUTION: Even though it may be rare, some people may have very bad and sometimes deadly side effects when taking a drug. Tell your doctor or get medical help right away if you have any of the following signs or symptoms that may be related to a very bad side effect:   Kidney problems like unable to pass urine, blood in the urine, change in amount of urine passed, or weight gain  Liver problems like dark urine, fatigue, lack of appetite, nausea, abdominal pain, light-colored stools, vomiting, or yellow skin or eyes  Depression like thoughts of suicide, anxiety, agitation, irritability, panic attacks, mood changes, behavioral changes, or confusion  Swollen glands  Behavioral changes  Trouble with memory  Vision changes  Confusion  Tremors  Trouble breathing  Slow breathing  Shallow breathing  Blue/gray skin discoloration  Shortness of breath  Excessive weight gain  Swelling of arms or legs  Severe loss of strength and energy  Involuntary eye movements  Twitching  Chills  Sore throat  Muscle pain  Muscle weakness  Abnormal movements  Trouble swallowing  Change in balance  Trouble speaking  Trouble focusing  Severe fatigue  Passing out  Severe dizziness  Signs of an allergic reaction, like rash; hives; itching; red, swollen, blistered, or peeling skin with or without fever; wheezing; tightness in the chest or throat; trouble breathing, swallowing, or talking; unusual hoarseness; or swelling of the mouth,  face, lips, tongue, or throat.  Note: This is not a comprehensive list of all side effects. Talk to your doctor if you have questions.  Consumer Information Use and Disclaimer: This information should not be used to decide whether or not to take this medicine or any other medicine. Only the healthcare provider has the knowledge and training to decide which medicines are right for a specific patient. This information does not endorse any medicine as safe, effective, or approved for treating any patient or health condition. This is only a limited summary of general information about the medicine’s uses from the patient education leaflet and is not intended to be comprehensive. This limited summary does NOT include all information available about the possible uses, directions, warnings, precautions, interactions, adverse effects, or risks that may apply to this medicine. This information is not intended to provide medical advice, diagnosis or treatment and does not replace information you receive from the healthcare provider. For a more detailed summary of information about the risks and benefits of using this medicine, please speak with your healthcare provider and review the entire patient education leaflet.  Copyright   © 2024 [employer] and its affiliates and/or licensors. All rights reserved.  Last Reviewed Date   2023-05-09  Patient Education     Core Strengthening Exercises on Back or on Hands and Knees   About this topic   Your core muscles are in your chest, back, buttock, and stomach area. They are your abdominal, back, and pelvis muscles. These muscles help keep your body stable when using your arms or legs. They also help with balance and posture. There are many exercises you can do to keep these muscles strong.  If you have back problems like a compression fracture or a ruptured disc, doing some of these exercises could make your problem worse. Some of these exercises may cause lower back  pain.  General   Before starting with a program, ask your doctor if you are healthy enough to do these exercises. Your doctor may have you work with a , chiropractor, or physical therapist to make a safe exercise program to meet your needs.  Strengthening Exercises   Strengthening exercises keep your muscles firm and strong. Start by repeating each exercise 2 to 3 times. Work up to doing each exercise 10 times. Try to do the exercises 2 to 3 times each day. Hold each exercise for 3 to 5 seconds. Do all exercises slowly.  Hip lifts ? Lie on your back with your knees bent and feet flat on the floor. Tighten your stomach muscles and push your heels into the floor to lift your buttocks off the floor. Relax.  Pelvic tilts ? Lie on your back with your knees bent and feet flat on the floor. Tighten your stomach muscles and press your lower back down to the floor. Relax.  Straight leg raises lying down ? Lie on your back with one leg straight. Bend your other knee so the foot is flat on the bed. Keeping your leg straight, lift the leg up to the level of your other knee. Lower it back down. Repeat with the other leg.  Knee flex lying down ? Lie on your back with both knees bent and your feet flat on the floor. Tighten your belly muscles. Raise one leg up and back down as if you are marching in slow motion. Keep belly muscles tight while you move your leg. Switch legs. To make this exercise harder, raise both arms straight up in the air. Tighten your belly muscles. When you raise one leg up, reach the opposite arm over your head. Switch, moving the opposite arm and leg until you have done 10 repetitions on each side.  Abdominal crunches ? Lie on your back with both knees bent. Keep your feet flat on the floor. Place your hands in one of these positions. Try starting with the first position since it is the easiest. As you get better, use the other positions to make it harder.  Crunches with arms at sides.  Crunches with  arms across chest.  Crunches with arms behind head. Be careful not to interlock your fingers behind your neck or head while doing crunches. This may add tension to your neck and cause strain.  Look at the ceiling. Tighten your belly muscles and lift your shoulders and upper back off the floor. Breathe out while you are doing this. Lower your shoulders to the floor. Breathe in while you are doing this. Relax your belly muscles all the way before starting another crunch.  Arm and leg lifts on hands and knees ? Start on your hands and knees. With all of these exercises, keep your back as level as possible. If you are having trouble with this, you may want to put a small object on your back such as a book. If it falls off, you are not keeping your back level enough during the exercise.  Lift one arm up to shoulder level and hold. Lower it back down. Now, lift up the other arm and hold.  Lift one leg up and kick it straight out until it is in line with your back and hold. Lower it back down. Now, lift up the other leg and hold.  Lift one arm and the OPPOSITE leg up at the same time and hold. Lower them down. Now, repeat using the other arm and leg. This is a very hard exercise. It may take time to be able to do this.               What will the results be?   Stronger core  Better balance  More toned belly and back muscles  Easier to do daily activities  Better sports performance  Less low back pain  Helpful tips   Stay active and work out to keep your muscles strong and flexible.  Keep a healthy weight to avoid putting too much stress on your spine. Eat a healthy diet to keep your muscles healthy.  Be sure you do not hold your breath when exercising. This can raise your blood pressure. If you tend to hold your breath, try counting out loud when exercising. If any exercise bothers you, stop right away.  Try walking or cycling at an easy pace for a few minutes to warm up your muscles. Do this again after  exercising.  Exercise may be slightly uncomfortable, but you should not have sharp pains. If you do get sharp pains, stop what you are doing. If the sharp pains continue, call your doctor.  Last Reviewed Date   2021-03-18  Consumer Information Use and Disclaimer   This generalized information is a limited summary of diagnosis, treatment, and/or medication information. It is not meant to be comprehensive and should be used as a tool to help the user understand and/or assess potential diagnostic and treatment options. It does NOT include all information about conditions, treatments, medications, side effects, or risks that may apply to a specific patient. It is not intended to be medical advice or a substitute for the medical advice, diagnosis, or treatment of a health care provider based on the health care provider's examination and assessment of a patient’s specific and unique circumstances. Patients must speak with a health care provider for complete information about their health, medical questions, and treatment options, including any risks or benefits regarding use of medications. This information does not endorse any treatments or medications as safe, effective, or approved for treating a specific patient. [employer] and its affiliates disclaim any warranty or liability relating to this information or the use thereof. The use of this information is governed by the Terms of Use, available at https://www.wolterskluwer.com/en/know/clinical-effectiveness-terms   Copyright   Copyright © 2024 [employer] and its affiliates and/or licensors. All rights reserved.

## 2025-02-22 ENCOUNTER — APPOINTMENT (EMERGENCY)
Dept: RADIOLOGY | Facility: HOSPITAL | Age: 45
End: 2025-02-22
Payer: COMMERCIAL

## 2025-02-22 ENCOUNTER — HOSPITAL ENCOUNTER (EMERGENCY)
Facility: HOSPITAL | Age: 45
Discharge: HOME | End: 2025-02-22
Attending: EMERGENCY MEDICINE | Admitting: EMERGENCY MEDICINE
Payer: COMMERCIAL

## 2025-02-22 VITALS
BODY MASS INDEX: 32.5 KG/M2 | HEART RATE: 76 BPM | OXYGEN SATURATION: 97 % | DIASTOLIC BLOOD PRESSURE: 72 MMHG | RESPIRATION RATE: 14 BRPM | HEIGHT: 63 IN | SYSTOLIC BLOOD PRESSURE: 132 MMHG | TEMPERATURE: 99 F | WEIGHT: 183.42 LBS

## 2025-02-22 DIAGNOSIS — J11.1 INFLUENZA: Primary | ICD-10-CM

## 2025-02-22 DIAGNOSIS — R11.0 NAUSEA: ICD-10-CM

## 2025-02-22 LAB
ANION GAP SERPL CALCULATED.3IONS-SCNC: 8 MMOL/L (ref 4–13)
BASOPHILS # BLD AUTO: 0.01 THOUSANDS/ΜL (ref 0–0.1)
BASOPHILS NFR BLD AUTO: 0 % (ref 0–1)
BUN SERPL-MCNC: 12 MG/DL (ref 5–25)
CALCIUM SERPL-MCNC: 9.1 MG/DL (ref 8.4–10.2)
CARDIAC TROPONIN I PNL SERPL HS: <2 NG/L (ref ?–50)
CHLORIDE SERPL-SCNC: 102 MMOL/L (ref 96–108)
CO2 SERPL-SCNC: 27 MMOL/L (ref 21–32)
CREAT SERPL-MCNC: 0.83 MG/DL (ref 0.6–1.3)
EOSINOPHIL # BLD AUTO: 0 THOUSAND/ΜL (ref 0–0.61)
EOSINOPHIL NFR BLD AUTO: 0 % (ref 0–6)
ERYTHROCYTE [DISTWIDTH] IN BLOOD BY AUTOMATED COUNT: 12.6 % (ref 11.6–15.1)
FLUAV AG UPPER RESP QL IA.RAPID: NEGATIVE
FLUBV AG UPPER RESP QL IA.RAPID: POSITIVE
GFR SERPL CREATININE-BSD FRML MDRD: 87 ML/MIN/1.73SQ M
GLUCOSE SERPL-MCNC: 93 MG/DL (ref 65–140)
HCT VFR BLD AUTO: 44.1 % (ref 34.8–46.1)
HGB BLD-MCNC: 14.7 G/DL (ref 11.5–15.4)
IMM GRANULOCYTES # BLD AUTO: 0.05 THOUSAND/UL (ref 0–0.2)
IMM GRANULOCYTES NFR BLD AUTO: 1 % (ref 0–2)
LYMPHOCYTES # BLD AUTO: 1.17 THOUSANDS/ΜL (ref 0.6–4.47)
LYMPHOCYTES NFR BLD AUTO: 15 % (ref 14–44)
MCH RBC QN AUTO: 30.8 PG (ref 26.8–34.3)
MCHC RBC AUTO-ENTMCNC: 33.3 G/DL (ref 31.4–37.4)
MCV RBC AUTO: 93 FL (ref 82–98)
MONOCYTES # BLD AUTO: 0.68 THOUSAND/ΜL (ref 0.17–1.22)
MONOCYTES NFR BLD AUTO: 9 % (ref 4–12)
NEUTROPHILS # BLD AUTO: 5.84 THOUSANDS/ΜL (ref 1.85–7.62)
NEUTS SEG NFR BLD AUTO: 75 % (ref 43–75)
NRBC BLD AUTO-RTO: 0 /100 WBCS
PLATELET # BLD AUTO: 183 THOUSANDS/UL (ref 149–390)
PMV BLD AUTO: 10.6 FL (ref 8.9–12.7)
POTASSIUM SERPL-SCNC: 3.6 MMOL/L (ref 3.5–5.3)
RBC # BLD AUTO: 4.77 MILLION/UL (ref 3.81–5.12)
SARS-COV+SARS-COV-2 AG RESP QL IA.RAPID: NEGATIVE
SODIUM SERPL-SCNC: 137 MMOL/L (ref 135–147)
WBC # BLD AUTO: 7.75 THOUSAND/UL (ref 4.31–10.16)

## 2025-02-22 PROCEDURE — 36415 COLL VENOUS BLD VENIPUNCTURE: CPT | Performed by: EMERGENCY MEDICINE

## 2025-02-22 PROCEDURE — 99284 EMERGENCY DEPT VISIT MOD MDM: CPT

## 2025-02-22 PROCEDURE — 84484 ASSAY OF TROPONIN QUANT: CPT | Performed by: EMERGENCY MEDICINE

## 2025-02-22 PROCEDURE — 96374 THER/PROPH/DIAG INJ IV PUSH: CPT

## 2025-02-22 PROCEDURE — 87811 SARS-COV-2 COVID19 W/OPTIC: CPT | Performed by: EMERGENCY MEDICINE

## 2025-02-22 PROCEDURE — 80048 BASIC METABOLIC PNL TOTAL CA: CPT | Performed by: EMERGENCY MEDICINE

## 2025-02-22 PROCEDURE — 71045 X-RAY EXAM CHEST 1 VIEW: CPT

## 2025-02-22 PROCEDURE — 85025 COMPLETE CBC W/AUTO DIFF WBC: CPT | Performed by: EMERGENCY MEDICINE

## 2025-02-22 PROCEDURE — 96361 HYDRATE IV INFUSION ADD-ON: CPT

## 2025-02-22 PROCEDURE — 87804 INFLUENZA ASSAY W/OPTIC: CPT | Performed by: EMERGENCY MEDICINE

## 2025-02-22 PROCEDURE — 93005 ELECTROCARDIOGRAM TRACING: CPT

## 2025-02-22 PROCEDURE — 99285 EMERGENCY DEPT VISIT HI MDM: CPT | Performed by: EMERGENCY MEDICINE

## 2025-02-22 RX ORDER — ONDANSETRON 4 MG/1
4 TABLET, FILM COATED ORAL EVERY 6 HOURS
Qty: 20 TABLET | Refills: 0 | Status: SHIPPED | OUTPATIENT
Start: 2025-02-22

## 2025-02-22 RX ORDER — SODIUM CHLORIDE 9 MG/ML
3 INJECTION INTRAVENOUS
Status: DISCONTINUED | OUTPATIENT
Start: 2025-02-22 | End: 2025-02-22 | Stop reason: HOSPADM

## 2025-02-22 RX ORDER — ONDANSETRON 2 MG/ML
4 INJECTION INTRAMUSCULAR; INTRAVENOUS ONCE
Status: COMPLETED | OUTPATIENT
Start: 2025-02-22 | End: 2025-02-22

## 2025-02-22 RX ADMIN — ONDANSETRON 4 MG: 2 INJECTION INTRAMUSCULAR; INTRAVENOUS at 10:00

## 2025-02-22 RX ADMIN — SODIUM CHLORIDE 1000 ML: 0.9 INJECTION, SOLUTION INTRAVENOUS at 10:00

## 2025-02-22 NOTE — Clinical Note
Martha Hair was seen and treated in our emergency department on 2/22/2025.                Diagnosis:     Martha  .    She may return on this date:          If you have any questions or concerns, please don't hesitate to call.      Miriam Alonso, DO    ______________________________           _______________          _______________  Hospital Representative                              Date                                Time

## 2025-02-22 NOTE — ED NOTES
Pt in no acute distress. Ambulates with a steady gait. Verbalizes understanding of discharge instructions       Aggie Arteaga RN  02/22/25 [id number]

## 2025-02-22 NOTE — ED PROVIDER NOTES
Time reflects when diagnosis was documented in both MDM as applicable and the Disposition within this note       Time User Action Codes Description Comment    2/22/2025 11:15 AM Miriam Alonos [J11.1] Influenza     2/22/2025 11:16 AM Miriam Alonso [R11.0] Nausea           ED Disposition       ED Disposition   Discharge    Condition   Stable    Date/Time   Sat Feb 22, 2025 11:15 AM    Comment   Martha Hair discharge to home/self care.                   Assessment & Plan       Medical Decision Making  Differential diagnosis includes but not limited to: Pneumonia, bronchitis, adverse drug reaction, viral syndrome    Amount and/or Complexity of Data Reviewed  Labs: ordered.  Radiology: ordered.    Risk  Prescription drug management.        ED Course as of 02/22/25 1205   Sat Feb 22, 2025   1110 Patient stable during the ED course and did not have any hypoxia.  Her cardiac workup was negative ruled out with 1 EKG as well as 1 set of heart enzymes that showed no acute findings       Medications   sodium chloride (PF) 0.9 % injection 3 mL (has no administration in time range)   sodium chloride 0.9 % bolus 1,000 mL (0 mL Intravenous Stopped 2/22/25 1121)   ondansetron (ZOFRAN) injection 4 mg (4 mg Intravenous Given 2/22/25 1000)       ED Risk Strat Scores      HEART Risk Score      Flowsheet Row Most Recent Value   Heart Score Risk Calculator    History 0 Filed at: 02/22/2025 1100   ECG 0 Filed at: 02/22/2025 1100   Age 0 Filed at: 02/22/2025 1100   Risk Factors 0 Filed at: 02/22/2025 1100   Troponin 0 Filed at: 02/22/2025 1100   HEART Score 0 Filed at: 02/22/2025 1100                              SBIRT 20yo+      Flowsheet Row Most Recent Value   Initial Alcohol Screen: US AUDIT-C     1. How often do you have a drink containing alcohol? 0 Filed at: 02/22/2025 0751   2. How many drinks containing alcohol do you have on a typical day you are drinking?  0 Filed at: 02/22/2025 0751   3b. FEMALE Any Age, or MALE  "65+: How often do you have 4 or more drinks on one occassion? 0 Filed at: 02/22/2025 0751   Audit-C Score 0 Filed at: 02/22/2025 0751   TOM: How many times in the past year have you...    Used an illegal drug or used a prescription medication for non-medical reasons? Never Filed at: 02/22/2025 0751                            History of Present Illness       Chief Complaint   Patient presents with    Dizziness     Pt reports three days of nausea, lethargy, and dizziness- pt reports intermittent chest pain in different placed on chest and back        Past Medical History:   Diagnosis Date    Acquired hypothyroidism     Kidney cysts     Lumbar disc herniation     Migraine     Motion sickness     Vitamin D deficiency       History reviewed. No pertinent surgical history.   History reviewed. No pertinent family history.   Social History     Tobacco Use    Smoking status: Never    Smokeless tobacco: Never   Vaping Use    Vaping status: Never Used   Substance Use Topics    Alcohol use: Not Currently    Drug use: Yes     Types: Marijuana      E-Cigarette/Vaping    E-Cigarette Use Never User       E-Cigarette/Vaping Substances      I have reviewed and agree with the history as documented.     This is a 44-year-old female presenting to the ED for evaluation of nausea, lethargy and dizziness.  Patient also reported intermittent chest pain radiating to her back.  States that the chest pain was more like \"spasms\" and underneath her breastbone bilaterally.  She denies any cough or runny nose.  She further denies any recent travel or sick contacts.        Review of Systems   Constitutional:  Positive for fatigue. Negative for chills and fever.   HENT:  Negative for ear pain and sore throat.    Eyes:  Negative for pain and visual disturbance.   Respiratory:  Negative for cough and shortness of breath.    Cardiovascular:  Negative for chest pain and palpitations.   Gastrointestinal:  Positive for nausea. Negative for abdominal pain " and vomiting.   Genitourinary:  Negative for dysuria and hematuria.   Musculoskeletal:  Negative for arthralgias and back pain.   Skin:  Negative for color change and rash.   Neurological:  Positive for weakness. Negative for seizures and syncope.   All other systems reviewed and are negative.          Objective       ED Triage Vitals [02/22/25 0753]   Temperature Pulse Blood Pressure Respirations SpO2 Patient Position - Orthostatic VS   99 °F (37.2 °C) 73 145/76 18 98 % Sitting      Temp Source Heart Rate Source BP Location FiO2 (%) Pain Score    Temporal Monitor Right arm -- --      Vitals      Date and Time Temp Pulse SpO2 Resp BP Pain Score FACES Pain Rating User   02/22/25 1100 -- 76 97 % 14 132/72 -- -- MD   02/22/25 1000 -- 70 97 % 12 132/73 -- --    02/22/25 0945 -- 71 95 % 19 132/78 -- -- MD   02/22/25 0930 -- 66 96 % 16 130/77 -- --    02/22/25 0845 -- 74 95 % 25 134/82 -- -- MD   02/22/25 0830 -- 72 96 % 19 130/76 -- -- MD   02/22/25 0753 99 °F (37.2 °C) 73 98 % 18 145/76 -- -- SS            Physical Exam  Vitals and nursing note reviewed.   Constitutional:       General: She is not in acute distress.     Appearance: Normal appearance. She is well-developed and normal weight.   HENT:      Head: Normocephalic and atraumatic.      Right Ear: External ear normal.      Left Ear: External ear normal.      Nose: Nose normal.      Mouth/Throat:      Mouth: Mucous membranes are moist.   Eyes:      Extraocular Movements: Extraocular movements intact.      Conjunctiva/sclera: Conjunctivae normal.   Neck:      Vascular: No carotid bruit.   Cardiovascular:      Rate and Rhythm: Normal rate and regular rhythm.      Heart sounds: No murmur heard.  Pulmonary:      Effort: Pulmonary effort is normal. No respiratory distress.      Breath sounds: Normal breath sounds.   Abdominal:      General: Abdomen is flat. Bowel sounds are normal. There is no distension.      Palpations: Abdomen is soft. There is no mass.       Tenderness: There is no abdominal tenderness. There is no guarding or rebound.      Hernia: No hernia is present.   Musculoskeletal:         General: No swelling, tenderness, deformity or signs of injury. Normal range of motion.      Cervical back: Normal range of motion and neck supple. No rigidity or tenderness.      Right lower leg: No edema.      Left lower leg: No edema.   Lymphadenopathy:      Cervical: No cervical adenopathy.   Skin:     General: Skin is warm and dry.      Capillary Refill: Capillary refill takes less than 2 seconds.      Coloration: Skin is not jaundiced or pale.      Findings: No bruising, erythema, lesion or rash.   Neurological:      General: No focal deficit present.      Mental Status: She is alert and oriented to person, place, and time. Mental status is at baseline.   Psychiatric:         Mood and Affect: Mood normal.         Behavior: Behavior normal.         Results Reviewed       Procedure Component Value Units Date/Time    FLU/COVID Rapid Antigen (30 min. TAT) - Preferred screening test in ED [[id number]]  (Abnormal) Collected: 02/22/25 0807    Lab Status: Final result Specimen: Nares from Nose Updated: 02/22/25 0912     SARS COV Rapid Antigen Negative     Influenza A Rapid Antigen Negative     Influenza B Rapid Antigen Positive    Narrative:      This test has been performed using the Quidel Lis 2 FLU+SARS Antigen test under the Emergency Use Authorization (EUA). This test has been validated by the  and verified by the performing laboratory. The Lis uses lateral flow immunofluorescent sandwich assay to detect SARS-COV, Influenza A and Influenza B Antigen.     The Quidel Lis 2 SARS Antigen test does not differentiate between SARS-CoV and SARS-CoV-2.     Negative results are presumptive and may be confirmed with a molecular assay, if necessary, for patient management. Negative results do not rule out SARS-CoV-2 or influenza infection and should not be used as the  sole basis for treatment or patient management decisions. A negative test result may occur if the level of antigen in a sample is below the limit of detection of this test.     Positive results are indicative of the presence of viral antigens, but do not rule out bacterial infection or co-infection with other viruses.     All test results should be used as an adjunct to clinical observations and other information available to the provider.    FOR PEDIATRIC PATIENTS - copy/paste COVID Guidelines URL to browser: [email or web address]    HS Troponin 0hr (reflex protocol) [[id number]]  (Normal) Collected: 02/22/25 0815    Lab Status: Final result Specimen: Blood from Arm, Right Updated: 02/22/25 0841     hs TnI 0hr <2 ng/L     Basic metabolic panel [[id number]] Collected: 02/22/25 0815    Lab Status: Final result Specimen: Blood from Arm, Right Updated: 02/22/25 0833     Sodium 137 mmol/L      Potassium 3.6 mmol/L      Chloride 102 mmol/L      CO2 27 mmol/L      ANION GAP 8 mmol/L      BUN 12 mg/dL      Creatinine 0.83 mg/dL      Glucose 93 mg/dL      Calcium 9.1 mg/dL      eGFR 87 ml/min/1.73sq m     Narrative:      National Kid[employer] guidelines for Chronic Kidney Disease (CKD):     Stage 1 with normal or high GFR (GFR > 90 mL/min/1.73 square meters)    Stage 2 Mild CKD (GFR = 60-89 mL/min/1.73 square meters)    Stage 3A Moderate CKD (GFR = 45-59 mL/min/1.73 square meters)    Stage 3B Moderate CKD (GFR = 30-44 mL/min/1.73 square meters)    Stage 4 Severe CKD (GFR = 15-29 mL/min/1.73 square meters)    Stage 5 End Stage CKD (GFR <15 mL/min/1.73 square meters)  Note: GFR calculation is accurate only with a steady state creatinine    CBC and differential [[id number]] Collected: 02/22/25 0815    Lab Status: Final result Specimen: Blood from Arm, Right Updated: 02/22/25 0819     WBC 7.75 Thousand/uL      RBC 4.77 Million/uL      Hemoglobin 14.7 g/dL       Hematocrit 44.1 %      MCV 93 fL      MCH 30.8 pg      MCHC 33.3 g/dL      RDW 12.6 %      MPV 10.6 fL      Platelets 183 Thousands/uL      nRBC 0 /100 WBCs      Segmented % 75 %      Immature Grans % 1 %      Lymphocytes % 15 %      Monocytes % 9 %      Eosinophils Relative 0 %      Basophils Relative 0 %      Absolute Neutrophils 5.84 Thousands/µL      Absolute Immature Grans 0.05 Thousand/uL      Absolute Lymphocytes 1.17 Thousands/µL      Absolute Monocytes 0.68 Thousand/µL      Eosinophils Absolute 0.00 Thousand/µL      Basophils Absolute 0.01 Thousands/µL             X-ray chest 1 view portable    (Results Pending)       ECG 12 Lead Documentation Only    Date/Time: 2/22/2025 8:10 AM    Performed by: Miriam Alonso DO  Authorized by: Miriam Alonso DO    Indications / Diagnosis:  Chest pain  Patient location:  ED  Interpretation:     Interpretation: normal    Rate:     ECG rate:  73    ECG rate assessment: normal    Rhythm:     Rhythm: sinus rhythm    Ectopy:     Ectopy: none    QRS:     QRS axis:  Normal    QRS intervals:  Normal  Conduction:     Conduction: normal    ST segments:     ST segments:  Normal  T waves:     T waves: normal    Comments:      Motion artifact present      ED Medication and Procedure Management   Prior to Admission Medications   Prescriptions Last Dose Informant Patient Reported? Taking?   Cyanocobalamin (VITAMIN B 12 PO)   Yes No   Sig: Take by mouth   SUMAtriptan (IMITREX) 100 mg tablet   Yes No   Sig: One pill by mouth at onset of migraine. May repeat in 2 hours but not more than 2 tablets in 24 hours   Patient taking differently: As needed   VITAMIN D, CHOLECALCIFEROL, PO   Yes No   Sig: Take by mouth   folic acid (FOLVITE) 400 mcg tablet   Yes No   Sig: Take 400 mcg by mouth daily   gabapentin (NEURONTIN) 300 mg capsule   No No   Sig: Take 1 capsule (300 mg total) by mouth 3 (three) times a day   levothyroxine 50 mcg tablet   Yes No   Sig: Take 1 tablet by  mouth in the morning   loratadine (CLARITIN) 10 mg tablet   Yes No   Sig: Take 10 mg by mouth   Patient taking differently: Take 10 mg by mouth PRN      Facility-Administered Medications: None     Discharge Medication List as of 2/22/2025 11:16 AM        START taking these medications    Details   ondansetron (ZOFRAN) 4 mg tablet Take 1 tablet (4 mg total) by mouth every 6 (six) hours, Starting Sat 2/22/2025, Normal           CONTINUE these medications which have NOT CHANGED    Details   Cyanocobalamin (VITAMIN B 12 PO) Take by mouth, Historical Med      folic acid (FOLVITE) 400 mcg tablet Take 400 mcg by mouth daily, Historical Med      gabapentin (NEURONTIN) 300 mg capsule Take 1 capsule (300 mg total) by mouth 3 (three) times a day, Starting Wed 2/12/2025, Normal      levothyroxine 50 mcg tablet Take 1 tablet by mouth in the morning, Starting Mon 1/20/2025, Historical Med      loratadine (CLARITIN) 10 mg tablet Take 10 mg by mouth, Historical Med      SUMAtriptan (IMITREX) 100 mg tablet One pill by mouth at onset of migraine. May repeat in 2 hours but not more than 2 tablets in 24 hours, Historical Med      VITAMIN D, CHOLECALCIFEROL, PO Take by mouth, Historical Med           No discharge procedures on file.  ED SEPSIS DOCUMENTATION   Time reflects when diagnosis was documented in both MDM as applicable and the Disposition within this note       Time User Action Codes Description Comment    2/22/2025 11:15 AM Miriam Alonso [J11.1] Influenza     2/22/2025 11:16 AM Miriam Alonso [R11.0] Nausea                  Miriam Alonso, DO  02/22/25 1119       Miriam Alonso, DO  02/22/25 1205       Miriam Alonso, DO  02/22/25 1205

## 2025-02-24 LAB
ATRIAL RATE: 73 BPM
P AXIS: 79 DEGREES
PR INTERVAL: 134 MS
QRS AXIS: 82 DEGREES
QRSD INTERVAL: 74 MS
QT INTERVAL: 382 MS
QTC INTERVAL: 420 MS
T WAVE AXIS: 74 DEGREES
VENTRICULAR RATE: 73 BPM

## 2025-02-24 PROCEDURE — 93010 ELECTROCARDIOGRAM REPORT: CPT | Performed by: INTERNAL MEDICINE
